# Patient Record
(demographics unavailable — no encounter records)

---

## 2024-11-14 NOTE — PROCEDURE
[Consent] : consent was given by patient or guardian [Site Verification] : the injection site was verified [Post-Injection Instructions Provided] : Post-injection instructions were provided

## 2025-07-21 NOTE — PROCEDURE
[FreeTextEntry1] : Thyroid Ultrasound: 07/21/2025 Left partially cystic thyroid nodule [id number]  Thyroid Ultrasound: 07/2024 To left abutting nodules previously read as a single nodule.  L solid nodule [id number] L solid nodule 9x6 x7mm   Bone Mineral Density: 07/10/2024 Indication: Comparison to outside study 2022, assess response to medication Spine: L2-4, -3.5, osteoporosis, prior report -3.4 Total hip: -1.3, osteopenia, prior report -1.4 Femoral neck: -1.9, osteopenia, prior report -1.9 Proximal radius: -2.2, osteopenia, no prior report   Thyroid USG 1/3/24 Two nodules stuck together Small L solid nodule [id number] Large L solid nodule [id number]  Thyroid US 12/22/2022 Left single nodule: 12 mm x 10 mm x 15 mm   Bone Density May 2022 Spine -3.4 osteoporosis prior report -3.0 Total Hip -1.4 osteopenia , -1.7 osteopenia  Femoral Neck -1.9 osteopenia , -2.1 osteopenia   thyroid ultrasound 5/23/16 Left single nodule 0.8 x 0.7 x 1.5 cm, no change

## 2025-07-21 NOTE — HISTORY OF PRESENT ILLNESS
[Regular Dental Follow-Up] : regular dental follow-up [FreeTextEntry1] : Pt returns for a follow up visit for osteoporosis and thyroid nodules. No interval health changes. No major surgeries, hospitalizations, fractures or changes in medication. Up to date with dentist. No major dental work planned.  History:  Pt was on Fosamax for 6 months in the past. Stopped for nonspecific reasons. Bone mineral density test 2015 spine -3.0, femoral neck -1.8, total Hip -1.0. The patient has had no osteoporosis related fractures. There are no other unusual which risk factors for osteoporosis.   Pt has no Hx of kidney stones or calcium issues. No Hx of anorexia nervosa. No ulcers or bleeding. No unusual risks for osteoporosis such as calcium disorders. No Hx of RT. No chronic prednisone use. No Hx of Paget's disease. No Hx of DVT or PE. Up to date with routine care.   Thyroid nodules:  The patient had a CAT scan of the chest which reportedly showed thyroid nodules US here 1.5 cm nodule. FNA class 2 benign. The patient has no previous history of thyroid disease or nodules. She has no associated symptoms such as neck pain or trouble swallowing. She is no history of or symptoms of hyperthyroidism or hypothyroidism. There is no family history of heart disease, history of exposure to radiation therapy. There is no history of exposure to lithium or other drugs that would impair thyroid function.   Pt wanted to start weight loss medication. She was prescribed Wegovy but it did not get approved by insurance.   Pt's  passed away 06/2024. [Disordered Eating] : no past or present history of disordered eating [Taking Steroids] : no past or present history of taking steroids [Kidney Stones] : no history of kidney stones [Family History of Osteoporosis] : no family history of osteoporosis [Family History of Breast Cancer] : no family history of breast cancer [Family History of Hip Fracture] : no family history of hip fracture [Hyperparathyroidism] : no hyperparathyroidism [History of Radiation Therapy] : no history of radiation therapy [Previous Fragility Fracture] : no previous fragility fracture

## 2025-07-21 NOTE — PHYSICAL EXAM
[Alert] : alert [Well Nourished] : well nourished [No Acute Distress] : no acute distress [Well Developed] : well developed [Normal Sclera/Conjunctiva] : normal sclera/conjunctiva [EOMI] : extra ocular movement intact [No Proptosis] : no proptosis [Normal Oropharynx] : the oropharynx was normal [Thyroid Not Enlarged] : the thyroid was not enlarged [No Thyroid Nodules] : no palpable thyroid nodules [No Respiratory Distress] : no respiratory distress [No Accessory Muscle Use] : no accessory muscle use [Clear to Auscultation] : lungs were clear to auscultation bilaterally [Normal S1, S2] : normal S1 and S2 [Normal Rate] : heart rate was normal [Regular Rhythm] : with a regular rhythm [No Edema] : no peripheral edema [Pedal Pulses Normal] : the pedal pulses are present [Normal Bowel Sounds] : normal bowel sounds [Not Tender] : non-tender [Not Distended] : not distended [Soft] : abdomen soft [Normal Anterior Cervical Nodes] : no anterior cervical lymphadenopathy [No Spinal Tenderness] : no spinal tenderness [Spine Straight] : spine straight [No Stigmata of Cushings Syndrome] : no stigmata of Cushings Syndrome [Normal Gait] : normal gait [Normal Strength/Tone] : muscle strength and tone were normal [Normal Reflexes] : deep tendon reflexes were 2+ and symmetric [No Tremors] : no tremors [Oriented x3] : oriented to person, place, and time [de-identified] : bjorn loyaus  [de-identified] : left lower pole nodule

## 2025-07-21 NOTE — PROCEDURE
[FreeTextEntry1] : Thyroid Ultrasound: 07/21/2025 Left partially cystic thyroid nodule [id number]  Thyroid Ultrasound: 07/2024 To left abutting nodules previously read as a single nodule.  L solid nodule [id number] L solid nodule 9x6 x7mm   Bone Mineral Density: 07/10/2024 Indication: Comparison to outside study 2022, assess response to medication Spine: L2-4, -3.5, osteoporosis, prior report -3.4 Total hip: -1.3, osteopenia, prior report -1.4 Femoral neck: -1.9, osteopenia, prior report -1.9 Proximal radius: -2.2, osteopenia, no prior report   Thyroid USG 1/3/24 Two nodules stuck together Small L solid nodule [id number] Large L solid nodule [id number]  Thyroid US 12/22/2022 Left single nodule: 12 mm x 10 mm x 15 mm   Bone Density May 2022 Spine -3.4 osteoporosis prior report -3.0 Total Hip -1.4 osteopenia , -1.7 osteopenia  Femoral Neck -1.9 osteopenia , -2.1 osteopenia   thyroid ultrasound 5/23/16 Left single nodule 0.8 x 0.7 x 1.5 cm, no change

## 2025-07-21 NOTE — ASSESSMENT
[Denosumab Therapy] : Risks  and benefits of denosumab therapy were discussed with the patient including eczema, cellulitis, osteonecrosis of the jaw and atypical femur fractures [FreeTextEntry1] : 73-year-old female presents with:   Osteoporosis BMD 2015 shows that overall bone density is low. Patient tried Fosamax and past and stopped due to nonspecific reasons. Pt was unwilling to consider similar oral therapy in the past. BMD 12/2022 shows that osteoporosis has worsened especially in spine.  Pt began Prolia 12/22, tolerating well. BMD 07/2024 indicates stable osteoporosis in the spine, stable osteopenia in total hip, fem neck and prox. radius.   Continue Prolia from [facility].   Thyroid US repeated in the office 1/3/24 indicated two Sub-centimeter solid nodules. The patient is clinically and chemically euthyroid. Prior ultrasound guided fine needle aspiration biopsy benign. TUS 07/2025 shows left stable nodule.  Request labs from Dr. True Wing  F/u in 6 months

## 2025-07-21 NOTE — END OF VISIT
[FreeTextEntry3] : This note was written by Alayna Rangel on (07/21/2025) acting as a medical scribe for Dr. Chen. This note was authored by the medical scribe for me. I have reviewed, edited, and revised the note as needed. I am in agreement with the exam findings, imaging findings, and treatment plan. Jalil Chen MD

## 2025-07-21 NOTE — PHYSICAL EXAM
[Alert] : alert [Well Nourished] : well nourished [No Acute Distress] : no acute distress [Well Developed] : well developed [Normal Sclera/Conjunctiva] : normal sclera/conjunctiva [EOMI] : extra ocular movement intact [No Proptosis] : no proptosis [Normal Oropharynx] : the oropharynx was normal [Thyroid Not Enlarged] : the thyroid was not enlarged [No Thyroid Nodules] : no palpable thyroid nodules [No Respiratory Distress] : no respiratory distress [No Accessory Muscle Use] : no accessory muscle use [Clear to Auscultation] : lungs were clear to auscultation bilaterally [Normal S1, S2] : normal S1 and S2 [Normal Rate] : heart rate was normal [Regular Rhythm] : with a regular rhythm [No Edema] : no peripheral edema [Pedal Pulses Normal] : the pedal pulses are present [Normal Bowel Sounds] : normal bowel sounds [Not Tender] : non-tender [Not Distended] : not distended [Soft] : abdomen soft [Normal Anterior Cervical Nodes] : no anterior cervical lymphadenopathy [No Spinal Tenderness] : no spinal tenderness [Spine Straight] : spine straight [No Stigmata of Cushings Syndrome] : no stigmata of Cushings Syndrome [Normal Gait] : normal gait [Normal Strength/Tone] : muscle strength and tone were normal [Normal Reflexes] : deep tendon reflexes were 2+ and symmetric [No Tremors] : no tremors [Oriented x3] : oriented to person, place, and time [de-identified] : bjorn loyaus  [de-identified] : left lower pole nodule